# Patient Record
Sex: FEMALE | URBAN - METROPOLITAN AREA
[De-identification: names, ages, dates, MRNs, and addresses within clinical notes are randomized per-mention and may not be internally consistent; named-entity substitution may affect disease eponyms.]

---

## 2018-01-22 ENCOUNTER — OFFICE VISIT CONVERTED (OUTPATIENT)
Dept: OTHER | Facility: HOSPITAL | Age: 43
End: 2018-01-22
Attending: INTERNAL MEDICINE

## 2018-06-11 ENCOUNTER — OFFICE VISIT CONVERTED (OUTPATIENT)
Dept: OTHER | Facility: HOSPITAL | Age: 43
End: 2018-06-11
Attending: INTERNAL MEDICINE

## 2021-05-28 VITALS
DIASTOLIC BLOOD PRESSURE: 63 MMHG | RESPIRATION RATE: 16 BRPM | HEART RATE: 61 BPM | HEIGHT: 60 IN | HEIGHT: 60 IN | SYSTOLIC BLOOD PRESSURE: 114 MMHG | BODY MASS INDEX: 29.5 KG/M2 | WEIGHT: 150.25 LBS | BODY MASS INDEX: 29.84 KG/M2 | HEART RATE: 66 BPM | WEIGHT: 152 LBS | DIASTOLIC BLOOD PRESSURE: 68 MMHG | OXYGEN SATURATION: 98 % | RESPIRATION RATE: 16 BRPM | OXYGEN SATURATION: 99 % | SYSTOLIC BLOOD PRESSURE: 109 MMHG | TEMPERATURE: 98.6 F | TEMPERATURE: 98.4 F

## 2021-05-28 NOTE — PROGRESS NOTES
Patient: ADRIANO RUTH     Acct: [id number]     Report: #[id number]  UNIT #: [id number]     : 1975    Encounter Date:2018  PRIMARY CARE:   ***Signed***  --------------------------------------------------------------------------------------------------------------------  Progress Note      DATE: 18      Referring Physician      NURIA      Chief Complaint      Iron Deficiency Anemia Follow-up            Subjective      Feels really good, is back to exercising again.            Patient brought in her  laboratory reports from [facility] prior to     starting her iron supplements.  Patient only had an iron of 14 and a iron     saturation of 3 and ferritin of 3.  Hemoglobin during that time was 7.4     hematocrit was 25.4 indicis were all low and platelet count was elevated.      Since then patient has started iron supplements 3 times a day with vitamin C     for increased absorption.            Past Med/Surg History            Past Med/Surg History:   No Hypertension             No Diabetes Mellitus             No Heart Disease             No Blood Clots             No Cancer             No Lung Disease             No Kidney Disease             No Other            Social History      Social History:  No Tobacco Use (smoke two years ago once a week), Alcohol Use (    social), No Recreational Drug use, No Other            Allergies      Coded Allergies:             NO KNOWN DRUG ALLERGIES (Verified  Allergy, Unknown, 17)            Medications      Medications    Last Reconciled on 17 15:15 by LESTER HAMMOND MD      Folic Acid (Folic Acid*) 0.4 Mg Tablet      400 MCG PO QDAY, TAB         Reported         17       Ferrous Sulfate (Ferrous Sulfate*) 325 Mg Tablet      325 MG PO TID, #30 TAB 0 Refills         Reported         17       Cholecalciferol (Vitamin D*) 2,000 Unit Cap      2000 UNITS PO QDAY, #30 CAP 0 Refills         Reported         17      Current  Medications      Current Medications Reviewed 1/22/18            Pain Assessment      Pain Intensity:  0      Description:  None            Review of Systems      General:  No Fatigue Scale:, No Pain Scale:      HEENT:  No Dysphagia, No Visual Changes      Respiratory:  No Cough, No Shortness of Air      Cardiovascular:  No Chest Pain, No Palpitations      Gastrointestinal:  No Nausea, No Vomiting, No Constipation, No Diarrhea,     Appetite Good      Genitourinary:  No Nocturia, No Dysuria      Musculoskeletal:  No Aches, No Pains      Endocrine:  No Heat Intolerance, No Fatigue      Hematologic:  No Bleeding, No Swollen Glands      Allergic/Immunologic:  No Hives, No Itchy eyes      Psychological:  No Anxiety, No Depression      Neurological:  No Headaches, No Dizziness      Skin:  No Rash, No Edema            Vitals      Height 5 ft 0 in / 152.4 cm      Weight 150 lbs 4 oz / 68.526247 kg      BSA 1.72 m2      BMI 29.3 kg/m2      Temperature 98.4 F / 36.89 C      Pulse 66      Respirations 16      Blood Pressure 114/63      Pulse Oximetry 98%            Exam      Constitutional:  No acute distress, Conversant, Pleasant      Eyes:  Anicteric sclerae, Palpebral Conjunctivae (pink), AMADOU      HENT:  Oropharynx clear, No Erythema, Buccal mucosae      Neck:  Supple, Full Range of Motion      Cardiovascular:  RRR, No Murmurs, Normal PMI, No Peripheral Edema      Lungs:  Clear to Ausculation, Normal Respiratory Effort      Abdomen:  Soft, NABS, No Tenderness      Chest:  Other (symmetrical and equal intervention)      Lymphatic:  No Neck      Extremities:  No digital cyanosis, Normal gait, Other (no deformity no     limitation range of motion)      Neurological:  Cranial Nerve II-XII, No Focal Sensory deficits      Psychological:  Appropriate affect, Intact judgement, Alert      Skin:  Normal temperature, Other (no dermatosis)            Lab Results      Laboratory Tests      12/6/17 14:00            1/22/18 16:55             Laboratory Tests            Test        10/27/17      18:34 10/27/17      23:45 12/6/17      14:00 12/8/17      22:02             Total Bilirubin        <0.15 mg/dL      (0.20-1.30)                      Aspartate Amino Transf      (AST/SGOT) 18 U/L (15-50)                      Alanine Aminotransferase      (ALT/SGPT) 13 U/L (10-40)                      Alkaline Phosphatase 81 U/L (42-98)                 Total Protein        7.3 g/dl      (6.3-8.2)                      Albumin        4.2 g/dl      (3.5-5.0)                      Globulin        3.1 g/dl      (2.0-3.5)                      Albumin/Globulin Ratio        1.4 RATIO      (1.4-2.6)                      Chlamydia trachomatis DNA      (PCR)         NOT DETECTED                      Neisseria gonorrhoeae DNA      (PCR)         NOT DETECTED                      Hypochromasia   MODERATE               Anisocytosis   MODERATE               Microcytosis   SLIGHT               Macrocytosis   SLIGHT               Activated Partial      Thromboplast Time                 23.1 seconds      (22.2-34.2)              Sodium Level                138 mmol/L      (135-147)              Potassium Level                4.1 mmol/L      (3.5-5.3)              Chloride Level                100 mmol/L      ()              Carbon Dioxide Level                25 mmol/L      (22-32)              Anion Gap                17 mmol/l      (8-19)              Blood Urea Nitrogen                12 mg/dl      (5-25)              Creatinine                0.75 mg/dl      (0.50-0.90)              Glomerular Filtration Rate                >60      ml/min/1.73m2              BUN/Creatinine Ratio                16 Ratio      (6-20)              Glucose Level                83 mg/dL      (65-99)              Serum Osmolality                285 mOsm/kg      (273-304)              Calcium Level                9.7 mg/dl      (8.7-10.4)              Lab Scanned Report                LAB FINAL       CUMULATIVES -             Test        1/22/18      16:55                      White Blood Count        2.84 K/ul      (4.80-10.80)                      Red Blood Count        4.37 M/ul      (4.20-5.40)                      Hemoglobin        13.50 g/dl      (12.00-16.00)                      Hematocrit        40.1 %      (37.0-47.0)                      Mean Corpuscular Volume        91.8 fl      (81.0-99.0)                      Mean Corpuscular Hemoglobin        31.0 pcg      (27.0-31.0)                      Mean Corpuscular Hemoglobin      Concent 33.7 %      (33.0-37.0)                      Red Cell Distribution Width        20.6 %      (11.5-14.5)                      Platelet Count        277.00 K/ul      (130..00)                      Mean Platelet Volume        10.3 fl      (7.4-10.4)                      Granulocytes (%)        59.2 %      (30.0-85.0)                      Lymphocytes (%) (Auto)        29.70 %      (20.00-45.00)                      Monocytes (%) (Auto)        8.21 %      (3.00-10.00)                      Eosinophils (%) (Auto)        2.17 %      (0.00-7.00)                      Basophils (%) (Auto)        0.76 %      (0.00-3.00)                      Granulocytes #        1.68 K/ul      (2.00-8.00)                      Lymphocytes # (Auto)        0.84 K/ul      (1.00-5.00)                      Monocytes # (Auto)        0.23 K/ul      (0.20-1.20)                      Eosinophils # (Auto)        0.06 K/ul      (0.00-0.70)                      Basophils # (Auto)        0.02 K/ul      (0.00-0.20)                      Nucleated Red Blood Cells %        0.00 %      (0.00-0.01)                      Absolute Reticulocyte Count 50.20 10e3/uL                 Percent Reticulocyte Count 1.15 %                 Iron Level        281 ug/dL      ()                      Total Iron Binding Capacity        418 ug/dl      (245-450)                      Percent Iron Saturation 67 % (20-55)                  Transferrin        292.00 mg/dL      (250..00)                      Ferritin        27 ng/ml      ()                            Impression/Problem List      Iron deficiency anemia probably secondary to menorrhagia.  Patient has been put     on progesterone and her menstrual flow is much improved.  Hemoglobin hematocrit     and iron studies are improved significantly.      Low white count etiology to be determined      Notes      Changed Medications      * Ferrous Sulfate (Ferrous Sulfate*) 325 MG TABLET: 325 MG PO TID #30      New Diagnostics      * CBC, Stat       Dx: CECILIA (iron deficiency anemia) - D50.9      * Serum Ferritin Level, Stat       Dx: CECILIA (iron deficiency anemia) - D50.9      * Reticulocyte Count, Stat       Dx: CECILIA (iron deficiency anemia) - D50.9      * Iron Profile, Stat       Dx: CECILIA (iron deficiency anemia) - D50.9            Plan      Ferrous sulfate 1 tablet 3 times a day with vitamin C 500 mg 2 times a day      Return in 16 weeks with a CBC, reticulocyte count, iron profile, ferritin level            Patient Education:        Good Food Sources of Iron            Hx Influenza Vaccination:  No      Influenza Vaccine Declined:  No      2 or More Falls Past Year?:  No      Fall Past Year with Injury?:  No      Hx Pneumococcal Vaccination:  No      Encouraged to follow-up with:  PCP regarding preventative exams.      Chart initiated by      MAYCO Monet RN                 Disclaimer: Converted document may not contain table formatting or lab diagrams. Please see [employer] [facility] for the authenticated document.

## 2021-05-28 NOTE — PROGRESS NOTES
Patient: ADRIANO RUTH     Acct: [id number]     Report: #[id number]  UNIT #: [id number]     : 1975    Encounter Date:2018  PRIMARY CARE:   ***Signed***  --------------------------------------------------------------------------------------------------------------------  Progress Note      DATE: 18      Primary Care Provider:  [facility]      Referring Provider:  [facility]      Chief Complaint      Iron Deficiency Anemia Follow-up            Subjective      Denies problems. Feels good.            Patient's only taking 1 tablet of iron per day.  She has more energy and     exercising.  Last April she had a D  stripes.            Her menstruation is regular with no blood clots.      She has no infections.            Past Med/Surg History            Past Med/Surg History:   No Hypertension             No Diabetes Mellitus             No Heart Disease             No Blood Clots             No Cancer             No Lung Disease             No Kidney Disease             No Other            Social History      Social History:  No Tobacco Use (smoke two years ago once a week), Alcohol Use (    social), No Recreational Drug use, No Other            Allergies      Coded Allergies:             NO KNOWN DRUG ALLERGIES (Verified  Allergy, Unknown, 17)            Medications      Medications    Last Reconciled on 17 15:15 by LESTER HAMMOND MD      Ascorbic Acid (Vitamin C*) 500 Mg Tablet      500 MG PO QDAY, #60 TAB 0 Refills         Reported         18       Folic Acid (Folic Acid*) 0.4 Mg Tablet      400 MCG PO QDAY, TAB         Reported         17       Ferrous Sulfate (Ferrous Sulfate*) 325 Mg Tablet      325 MG PO QDAY, #30 TAB 0 Refills         Reported         17       Cholecalciferol (Vitamin D*) 2,000 Unit Cap      2000 UNITS PO QDAY, #30 CAP 0 Refills         Reported         17      Current Medications      Current Medications Reviewed 18             Pain Assessment      Pain Intensity:  0      Description:  None            Review of Systems      General:  No Fatigue Scale:, No Pain Scale:      HEENT:  No Dysphagia, No Hearing Changes, No Visual Changes      Respiratory:  No Cough, No Shortness of Air      Cardiovascular:  No Chest Pain, No Palpitations      Gastrointestinal:  No Nausea, No Vomiting, No Constipation, No Diarrhea,     Appetite Good      Genitourinary:  No Nocturia, No Dysuria      Musculoskeletal:  No Aches, No Pains      Endocrine:  No Heat Intolerance, No Fatigue      Hematologic:  No Bleeding, No Bruising      Allergic/Immunologic:  No Hives, No Nasal drip      Psychological:  No Anxiety, No Depression      Neurological:  No Headaches, No Dizziness      Skin:  No Rash, No Edema            Vitals      Height 5 ft 0 in / 152.4 cm      Weight 152 lbs 0 oz / 68.136273 kg      BSA 1.73 m2      BMI 29.7 kg/m2      Temperature 98.6 F / 37 C      Pulse 61      Respirations 16      Blood Pressure 109/68      Pulse Oximetry 99%            Exam      Constitutional:  No acute distress, Conversant, Pleasant      Eyes:  Anicteric sclerae, Palpebral Conjunctivae (pink), AMADOU      HENT:  Oropharynx clear, No Erythema, Buccal mucosae (pink), Other (right     nostril with earrings)      Neck:  Supple, Full Range of Motion      Cardiovascular:  RRR, No Murmurs, Normal PMI, No Peripheral Edema      Lungs:  Clear to Ausculation, Normal Respiratory Effort      Abdomen:  Soft, NABS, No Masses, No Tenderness      Chest:  Other (symmetrical and equal)      Lymphatic:  No Neck      Extremities:  No digital cyanosis, Normal gait, Other (no deformity no     limitation range of motion)      Neurological:  Cranial Nerve II-XII, No Focal Sensory deficits      Psychological:  Appropriate affect, Appropriate mood, Intact judgement, Alert      Skin:  Normal temperature, Other (no dermatosis)            Lab Results      Blood work done today            Impression/Problem  List      Iron deficiency anemia probably secondary to menorrhagia.  Status post D  thick endometrial stripes and uterine polyps      Low white count etiology to be determined      Notes      New Medications      * ASCORBIC ACID (Vitamin C*) 500 MG TABLET: 500 MG PO QDAY #60      Changed Medications      * Ferrous Sulfate (Ferrous Sulfate*) 325 MG TABLET: 325 MG PO QDAY #30      New Diagnostics      * CBC, Stat       Dx: CECILIA (iron deficiency anemia) - D50.9      * Ferritin Level, Stat       Dx: CECILIA (iron deficiency anemia) - D50.9      * Reticulocyte Count, Stat       Dx: CECILIA (iron deficiency anemia) - D50.9      * Iron Profile, Stat       Dx: CECILIA (iron deficiency anemia) - D50.9            Plan      Ferrous sulfate 1 tablet  a day with vitamin C 500 mg 2 times a day      Return in 6 months with a CBC, reticulocyte count, iron profile.            Patient Education:        Good Food Sources of Iron            PREVENTION      Hx Influenza Vaccination:  No      Influenza Vaccine Declined:  No      2 or More Falls Past Year?:  No      Fall Past Year with Injury?:  No      Hx Pneumococcal Vaccination:  No      Encouraged to follow-up with:  PCP regarding preventative exams.      Chart initiated by      MAYCO Monet RN                 Disclaimer: Converted document may not contain table formatting or lab diagrams. Please see [facility] for the authenticated document.